# Patient Record
Sex: FEMALE | Race: BLACK OR AFRICAN AMERICAN | NOT HISPANIC OR LATINO | URBAN - METROPOLITAN AREA
[De-identification: names, ages, dates, MRNs, and addresses within clinical notes are randomized per-mention and may not be internally consistent; named-entity substitution may affect disease eponyms.]

---

## 2021-12-22 ENCOUNTER — EMERGENCY (EMERGENCY)
Facility: HOSPITAL | Age: 26
LOS: 0 days | Discharge: LEFT BEFORE DISCHARGE | End: 2021-12-22
Attending: STUDENT IN AN ORGANIZED HEALTH CARE EDUCATION/TRAINING PROGRAM
Payer: MEDICAID

## 2021-12-22 VITALS
WEIGHT: 164.02 LBS | HEART RATE: 86 BPM | RESPIRATION RATE: 16 BRPM | DIASTOLIC BLOOD PRESSURE: 66 MMHG | SYSTOLIC BLOOD PRESSURE: 117 MMHG | HEIGHT: 65 IN | OXYGEN SATURATION: 96 % | TEMPERATURE: 98 F

## 2021-12-22 DIAGNOSIS — R17 UNSPECIFIED JAUNDICE: ICD-10-CM

## 2021-12-22 DIAGNOSIS — K81.9 CHOLECYSTITIS, UNSPECIFIED: ICD-10-CM

## 2021-12-22 DIAGNOSIS — R11.2 NAUSEA WITH VOMITING, UNSPECIFIED: ICD-10-CM

## 2021-12-22 DIAGNOSIS — R10.10 UPPER ABDOMINAL PAIN, UNSPECIFIED: ICD-10-CM

## 2021-12-22 LAB
ALBUMIN SERPL ELPH-MCNC: 3.8 G/DL — SIGNIFICANT CHANGE UP (ref 3.3–5)
ALP SERPL-CCNC: 349 U/L — HIGH (ref 40–120)
ALT FLD-CCNC: 705 U/L — HIGH (ref 12–78)
ANION GAP SERPL CALC-SCNC: 9 MMOL/L — SIGNIFICANT CHANGE UP (ref 5–17)
APPEARANCE UR: ABNORMAL
AST SERPL-CCNC: 414 U/L — HIGH (ref 15–37)
BACTERIA # UR AUTO: ABNORMAL
BASOPHILS # BLD AUTO: 0.08 K/UL — SIGNIFICANT CHANGE UP (ref 0–0.2)
BASOPHILS NFR BLD AUTO: 0.9 % — SIGNIFICANT CHANGE UP (ref 0–2)
BILIRUB DIRECT SERPL-MCNC: 3.3 MG/DL — HIGH (ref 0–0.3)
BILIRUB INDIRECT FLD-MCNC: 2.3 MG/DL — HIGH (ref 0.2–1)
BILIRUB SERPL-MCNC: 5.6 MG/DL — HIGH (ref 0.2–1.2)
BILIRUB SERPL-MCNC: 5.6 MG/DL — HIGH (ref 0.2–1.2)
BILIRUB UR-MCNC: ABNORMAL
BUN SERPL-MCNC: 8 MG/DL — SIGNIFICANT CHANGE UP (ref 7–23)
CALCIUM SERPL-MCNC: 8.8 MG/DL — SIGNIFICANT CHANGE UP (ref 8.5–10.1)
CHLORIDE SERPL-SCNC: 102 MMOL/L — SIGNIFICANT CHANGE UP (ref 96–108)
CO2 SERPL-SCNC: 28 MMOL/L — SIGNIFICANT CHANGE UP (ref 22–31)
COLOR SPEC: ABNORMAL
COMMENT - URINE: SIGNIFICANT CHANGE UP
CREAT SERPL-MCNC: 0.96 MG/DL — SIGNIFICANT CHANGE UP (ref 0.5–1.3)
DIFF PNL FLD: ABNORMAL
EOSINOPHIL # BLD AUTO: 0.08 K/UL — SIGNIFICANT CHANGE UP (ref 0–0.5)
EOSINOPHIL NFR BLD AUTO: 0.9 % — SIGNIFICANT CHANGE UP (ref 0–6)
EPI CELLS # UR: SIGNIFICANT CHANGE UP
GLUCOSE SERPL-MCNC: 90 MG/DL — SIGNIFICANT CHANGE UP (ref 70–99)
GLUCOSE UR QL: NEGATIVE MG/DL — SIGNIFICANT CHANGE UP
HCG SERPL-ACNC: <1 MIU/ML — SIGNIFICANT CHANGE UP
HCT VFR BLD CALC: 40.7 % — SIGNIFICANT CHANGE UP (ref 34.5–45)
HGB BLD-MCNC: 12.8 G/DL — SIGNIFICANT CHANGE UP (ref 11.5–15.5)
IMM GRANULOCYTES NFR BLD AUTO: 0.4 % — SIGNIFICANT CHANGE UP (ref 0–1.5)
KETONES UR-MCNC: ABNORMAL
LEUKOCYTE ESTERASE UR-ACNC: ABNORMAL
LIDOCAIN IGE QN: 142 U/L — SIGNIFICANT CHANGE UP (ref 73–393)
LYMPHOCYTES # BLD AUTO: 2.39 K/UL — SIGNIFICANT CHANGE UP (ref 1–3.3)
LYMPHOCYTES # BLD AUTO: 26 % — SIGNIFICANT CHANGE UP (ref 13–44)
MCHC RBC-ENTMCNC: 27.4 PG — SIGNIFICANT CHANGE UP (ref 27–34)
MCHC RBC-ENTMCNC: 31.4 GM/DL — LOW (ref 32–36)
MCV RBC AUTO: 87.2 FL — SIGNIFICANT CHANGE UP (ref 80–100)
MONOCYTES # BLD AUTO: 0.88 K/UL — SIGNIFICANT CHANGE UP (ref 0–0.9)
MONOCYTES NFR BLD AUTO: 9.6 % — SIGNIFICANT CHANGE UP (ref 2–14)
NEUTROPHILS # BLD AUTO: 5.74 K/UL — SIGNIFICANT CHANGE UP (ref 1.8–7.4)
NEUTROPHILS NFR BLD AUTO: 62.2 % — SIGNIFICANT CHANGE UP (ref 43–77)
NITRITE UR-MCNC: NEGATIVE — SIGNIFICANT CHANGE UP
NRBC # BLD: 0 /100 WBCS — SIGNIFICANT CHANGE UP (ref 0–0)
PH UR: 5 — SIGNIFICANT CHANGE UP (ref 5–8)
PLATELET # BLD AUTO: 288 K/UL — SIGNIFICANT CHANGE UP (ref 150–400)
POTASSIUM SERPL-MCNC: 3.5 MMOL/L — SIGNIFICANT CHANGE UP (ref 3.5–5.3)
POTASSIUM SERPL-SCNC: 3.5 MMOL/L — SIGNIFICANT CHANGE UP (ref 3.5–5.3)
PROT SERPL-MCNC: 8.4 GM/DL — HIGH (ref 6–8.3)
PROT UR-MCNC: 100 MG/DL
RBC # BLD: 4.67 M/UL — SIGNIFICANT CHANGE UP (ref 3.8–5.2)
RBC # FLD: 15.1 % — HIGH (ref 10.3–14.5)
RBC CASTS # UR COMP ASSIST: SIGNIFICANT CHANGE UP /HPF (ref 0–4)
SODIUM SERPL-SCNC: 139 MMOL/L — SIGNIFICANT CHANGE UP (ref 135–145)
SP GR SPEC: 1.02 — SIGNIFICANT CHANGE UP (ref 1.01–1.02)
UROBILINOGEN FLD QL: 1 MG/DL
WBC # BLD: 9.21 K/UL — SIGNIFICANT CHANGE UP (ref 3.8–10.5)
WBC # FLD AUTO: 9.21 K/UL — SIGNIFICANT CHANGE UP (ref 3.8–10.5)
WBC UR QL: SIGNIFICANT CHANGE UP

## 2021-12-22 PROCEDURE — 76705 ECHO EXAM OF ABDOMEN: CPT | Mod: 26

## 2021-12-22 PROCEDURE — 99284 EMERGENCY DEPT VISIT MOD MDM: CPT

## 2021-12-22 RX ORDER — SODIUM CHLORIDE 9 MG/ML
1000 INJECTION INTRAMUSCULAR; INTRAVENOUS; SUBCUTANEOUS ONCE
Refills: 0 | Status: COMPLETED | OUTPATIENT
Start: 2021-12-22 | End: 2021-12-22

## 2021-12-22 RX ADMIN — SODIUM CHLORIDE 1000 MILLILITER(S): 9 INJECTION INTRAMUSCULAR; INTRAVENOUS; SUBCUTANEOUS at 22:16

## 2021-12-22 RX ADMIN — SODIUM CHLORIDE 1000 MILLILITER(S): 9 INJECTION INTRAMUSCULAR; INTRAVENOUS; SUBCUTANEOUS at 21:03

## 2021-12-22 NOTE — ED ADULT NURSE NOTE - NSSUHOSCREENINGYN_ED_ALL_ED
Last Visit: 1/29/19  Next Appt: 5/21/19  Previous Refill Encounter: 5/8/18-30+5 (diff.  Provider)    Requested Prescriptions     Pending Prescriptions Disp Refills    terazosin (HYTRIN) 1 mg capsule 30 Cap 5     Sig: TAKE ONE CAPSULE BY MOUTH ONCE NIGHTLY
Yes - the patient is able to be screened

## 2021-12-22 NOTE — ED PROVIDER NOTE - PATIENT PORTAL LINK FT
You can access the FollowMyHealth Patient Portal offered by [facility] by registering at the following website: [email or web address]. By joining [facility]’s FollowMyHealth portal, you will also be able to view your health information using other applications (apps) compatible with our system.

## 2021-12-22 NOTE — ED PROVIDER NOTE - OBJECTIVE STATEMENT
25F no pmhx presenting to the ED complaining of abdominal pain, nausea/vomiting, and jaundice x 3 days. Pt also complains about heavy vaginal bleeding x 2 months for which she's been multiple EDs AND her OBGYN doctor - states everyone thus far has told her "it's a hormonal issue". Pt states that the bleeding has historically been a/w lower abdominal pain but that in the last 3 days she's had upper abdominal pain assoc w/ vomiting and jaundice and this is the first time she's sought medical attn for that issue. Pt denies heavy etoh usage instead stating that she has 2 drinks per week max, and only in social setting. denies illicit drug use.     Also denies cp, sob, dizziness, headache, cough, fever, diarrha.

## 2021-12-22 NOTE — ED PROVIDER NOTE - PROGRESS NOTE DETAILS
The patient is leaving against my medical advice.  I have informed the patient about the risks, benefits, and alternatives to the evaluation and treatment of their condition.  The patient reports understanding of these issues and has the capacity and insight to make important medical decisions. Clear return precautions were discussed, the patient was urged to seek follow-up care, with appropriate referrals made as needed. The necessity to follow up with PMD/Clinic/Specialist/follow up was provided and encouraged to be done within 2-3 days. The patient understands that this decision to go against medical advice can be changed at any time and the patient can return for re-evaluation and further treatment with any changes in condition or concerns. The patient understands that they can and will receive healthcare services regardless of ability to pay and that the hospital can and will work with them on any financial issue, and that this is not a reason to decline care. I have made the best effort to inform the primary care physician and their family members concerning this decision. The patient understands all the reasons to return to the Emergency Department, including any concerns at all, the patient reports understanding of above with capacity and insight.

## 2021-12-22 NOTE — ED ADULT NURSE NOTE - NS ED NURSE LEVEL OF CONSCIOUSNESS SPEECH
Called patient to inform him that Dr. Muro sent his a courtesy refill on his lisinopril. No answer, was left voicemail to call our office and schedule an annual physical in mid May. Lab orders will be left for patient to have them done prior to appointment.   
Speaking Coherently

## 2021-12-22 NOTE — ED ADULT NURSE REASSESSMENT NOTE - NS ED NURSE REASSESS COMMENT FT1
Patient received from BRENDA Ogden in bed 12. Patient is awake, alert and oriented x4, and agitated. Patient is upset, states "you guys are too slow for me" and wishes to leave. Doctor Jacquie aware of situation. States pending labs to be cancelled.

## 2021-12-22 NOTE — ED PROVIDER NOTE - CLINICAL SUMMARY MEDICAL DECISION MAKING FREE TEXT BOX
visibly jaundiced on exam with bilateral scleral icterus. +upper abdominal tenderness, mild; no murphys sign. No lower abdominal tenderness. Pt otherwise well and nontoxic appearing. Labs, US RUQ, fluids. DDx includes hemolysis disorder, biliary obstruction. Likely DC to heme/onc outpt.

## 2021-12-22 NOTE — ED ADULT NURSE NOTE - OBJECTIVE STATEMENT
received er bed 12 c/o ruq pain intermittently x 3 days with n/v denies fever/chills previously seen at [facility] 2 weeks ago for same c/o no abnormal findings seen at ob/gyn for abnormal/prolonged menses given unknown med x 10 days which caused cessation of bleeding x 1 day then resumed pt noted with slight jaundice of sclera abdomen soft no distention noted

## 2021-12-22 NOTE — ED ADULT TRIAGE NOTE - CHIEF COMPLAINT QUOTE
abd pain more on RUQ pt jaundice Pt had recent work up aT [facility] and told everything normal abd pain more on RUQ pt jaundice Pt had recent work up aT [facility] and told everything normal and since Oct 27 has been having vaginal bleeding every day OBGYN gave her pills with temporary relief

## 2021-12-22 NOTE — ED ADULT NURSE NOTE - CHIEF COMPLAINT QUOTE
abd pain more on RUQ pt jaundice Pt had recent work up aT [facility] and told everything normal and since Oct 27 has been having vaginal bleeding every day OBGYN gave her pills with temporary relief

## 2021-12-22 NOTE — ED PROVIDER NOTE - PHYSICAL EXAMINATION
Constitutional: Well appearing, awake, alert, oriented to person, place, time/situation and in no apparent distress.  ENMT: Airway patent.  Eyes: Icterus bilaterally, pupils equal, round and reactive to light.  Cardiac: Normal rate, regular rhythm.  Heart sounds S1, S2.  Respiratory: Breath sounds clear and equal bilaterally.  Gastrointestinal: Mild upper abdominal ttp; no lower ttp. no rebound/guarding.   Neurological: Alert and oriented, no focal deficits, no motor or sensory deficits.  Skin: No evidence of rash. Jaundiced.

## 2021-12-25 LAB
CULTURE RESULTS: SIGNIFICANT CHANGE UP
SPECIMEN SOURCE: SIGNIFICANT CHANGE UP

## 2022-03-04 NOTE — ED PROVIDER NOTE - DOMESTIC TRAVEL HIGH RISK QUESTION
[de-identified] : Ms. LEONIDAS GARCÍA  is a 37 year old female who presents for a follow up visit. She is s/p I&D left breast abscess on 2/18/22. She states there was minimal drainage from the left breast and is continuing her antibiotics for 2 more days. \par \par She is 6wks post partum and developed a bilateral breast infection secondary to nursing. She had US guided aspirations last week and had been on keflex. She is currently on Bactrim as per her GYN Dr. Rajwinder Breaux who was concerned about the appearance of the left breast. The right breast started to drain spontaneously. No fever/chills.\par \par S/p I&D of the left breast abscess on 2/18/2022.  20-30 cc's of pus drained and sent for culture. Penrose drain placed. Pt. was advised to continue Bactrim.  Cultures revealed moderate staphylococcus aureus - Bactrim sensitive. \par \par 
No